# Patient Record
Sex: FEMALE | Race: WHITE | NOT HISPANIC OR LATINO | Employment: UNEMPLOYED
[De-identification: names, ages, dates, MRNs, and addresses within clinical notes are randomized per-mention and may not be internally consistent; named-entity substitution may affect disease eponyms.]

---

## 2022-10-02 ENCOUNTER — HEALTH MAINTENANCE LETTER (OUTPATIENT)
Age: 22
End: 2022-10-02

## 2022-10-14 ENCOUNTER — OFFICE VISIT (OUTPATIENT)
Dept: FAMILY MEDICINE | Facility: CLINIC | Age: 22
End: 2022-10-14
Payer: COMMERCIAL

## 2022-10-14 ENCOUNTER — ANCILLARY PROCEDURE (OUTPATIENT)
Dept: GENERAL RADIOLOGY | Facility: CLINIC | Age: 22
End: 2022-10-14
Attending: STUDENT IN AN ORGANIZED HEALTH CARE EDUCATION/TRAINING PROGRAM
Payer: COMMERCIAL

## 2022-10-14 VITALS
DIASTOLIC BLOOD PRESSURE: 94 MMHG | HEART RATE: 121 BPM | WEIGHT: 241 LBS | SYSTOLIC BLOOD PRESSURE: 134 MMHG | TEMPERATURE: 98.6 F | RESPIRATION RATE: 16 BRPM

## 2022-10-14 DIAGNOSIS — M54.42 CHRONIC MIDLINE LOW BACK PAIN WITH LEFT-SIDED SCIATICA: Primary | ICD-10-CM

## 2022-10-14 DIAGNOSIS — G89.29 CHRONIC MIDLINE LOW BACK PAIN WITH LEFT-SIDED SCIATICA: ICD-10-CM

## 2022-10-14 DIAGNOSIS — M54.42 CHRONIC MIDLINE LOW BACK PAIN WITH LEFT-SIDED SCIATICA: ICD-10-CM

## 2022-10-14 DIAGNOSIS — G89.29 CHRONIC MIDLINE LOW BACK PAIN WITH LEFT-SIDED SCIATICA: Primary | ICD-10-CM

## 2022-10-14 PROCEDURE — 72100 X-RAY EXAM L-S SPINE 2/3 VWS: CPT | Mod: TC | Performed by: RADIOLOGY

## 2022-10-14 PROCEDURE — 99204 OFFICE O/P NEW MOD 45 MIN: CPT | Performed by: STUDENT IN AN ORGANIZED HEALTH CARE EDUCATION/TRAINING PROGRAM

## 2022-10-14 RX ORDER — BUSPIRONE HYDROCHLORIDE 7.5 MG/1
7.5 TABLET ORAL 2 TIMES DAILY
COMMUNITY

## 2022-10-14 RX ORDER — CYCLOBENZAPRINE HCL 5 MG
5 TABLET ORAL 3 TIMES DAILY PRN
Qty: 30 TABLET | Refills: 1 | Status: SHIPPED | OUTPATIENT
Start: 2022-10-14

## 2022-10-14 ASSESSMENT — ENCOUNTER SYMPTOMS: BACK PAIN: 1

## 2022-10-14 NOTE — PROGRESS NOTES
Assessment & Plan     Chronic midline low back pain with left-sided sciatica  Chronic for the past 6 months, no trauma or injury.  Patient reports occasional left lower extremity numbness and tingling usually comes on with prolonged standing or walking and improves with sitting.  Patient reports back pain that is worse with tension of the back and improved with leaning forward such as pushing a cart.  Differential includes lower back pain with left-sided sciatica, left-sided piriformis syndrome, also on the differential is spinal stenosis.  Will order x-ray and MRI given neurological symptoms as noted above suspicion for spinal stenosis.  - cyclobenzaprine (FLEXERIL) 5 MG tablet  Dispense: 30 tablet; Refill: 1  - MR Lumbar Spine w/o Contrast  - XR Lumbar Spine 2/3 Views  - Physical Therapy Referral      Return in about 4 weeks (around 11/11/2022) for Follow up back pain.    Barby Srivastava MD  [facility]    Mirella Barnett is a 22 year old presenting for the following health issues:  Back Pain (Since April, lower back down into left leg )      Back Pain     History of Present Illness       Back Pain:  She presents for follow up of back pain. Patient's back pain is a chronic problem.  Location of back pain:  Left buttock and left hip  Description of back pain: dull ache  Back pain spreads: left buttocks, left thigh and left knee    Since patient first noticed back pain, pain is: gradually worsening  Does back pain interfere with her job:  Not applicable      She eats 2-3 servings of fruits and vegetables daily.She consumes 0 sweetened beverage(s) daily.She exercises with enough effort to increase her heart rate 20 to 29 minutes per day.  She exercises with enough effort to increase her heart rate 4 days per week.   She is taking medications regularly.       Back Pain with Left leg pain  - Onset: April, 6 months, no initial event. Worked at [employer] up until Feb, involves lifting.   -  Location/radiation:left lower back, and middle.   - Character: dull ache  - Aggravating factors: laying on left side, standing or sitting for a long time.  - Relieving factors: leaning forward such as pushing a cart at the store, ibuprofen helps, tylenol doesn't help  - Not able to walk for very long, has to sit and it gets better.  - Severity: at worst 7/10  - Denies red flags, including: Fever, history of IV drug use or malignancy, trauma or injury, loss of bowel or bladder control, saddle anesthesia,weakness of lower extremities.   - Occasional momentary numbness and tingling of the left leg, improved with sitting.         Review of Systems   Musculoskeletal: Positive for back pain.   Constitutional: Negative for fever and chills.   Respiratory: Negative for cough or shortness of breath.    Cardiovascular: Negative for chest pain or chest pressure.   Gastrointestinal: Negative for nausea, vomiting, diarrhea or abdominal pain.        Objective    BP (!) 134/94 (BP Location: Right arm, Patient Position: Sitting, Cuff Size: Adult Large)   Pulse (!) 121   Temp 98.6  F (37  C) (Temporal)   Resp 16   Wt 241 lb (109.3 kg)   There is no height or weight on file to calculate BMI.  Physical Exam   General Appearance:  Alert, cooperative, no distress, appears stated age.  Head:  Normocephalic, without obvious abnormality, atraumatic.  Eyes:  Conjunctivae/corneas clear, extraocular movements intact both eyes.  Lungs:  Clear to auscultation bilaterally, respirations unlabored.  Heart:  Regular rate and rhythm, S1 and S2 normal, no murmur, rub or gallop.  Abdomen:  Soft, non-tender, bowel sounds active all four quadrants.   Extremities:  Atraumatic, no cyanosis or edema.  Skin:  Skin color, texture, turgor normal, no rashes or lesions.  Neurologic: No focal deficits.  Back: Mild TTP of the midline lumbar spine and left paraspinal muscles. Positive straight leg raise on the left, negative on the right.  Limited extension of  the back secondary to pain.  Normal side bending and bending.  Normal strength and sensation in BLE.

## 2022-11-09 ENCOUNTER — HOSPITAL ENCOUNTER (OUTPATIENT)
Dept: PHYSICAL THERAPY | Facility: REHABILITATION | Age: 22
Discharge: HOME | End: 2022-11-09
Attending: STUDENT IN AN ORGANIZED HEALTH CARE EDUCATION/TRAINING PROGRAM
Payer: COMMERCIAL

## 2022-11-09 DIAGNOSIS — M54.42 CHRONIC MIDLINE LOW BACK PAIN WITH LEFT-SIDED SCIATICA: ICD-10-CM

## 2022-11-09 DIAGNOSIS — M54.16 LEFT LUMBAR RADICULOPATHY: Primary | ICD-10-CM

## 2022-11-09 DIAGNOSIS — M62.81 MUSCLE WEAKNESS (GENERALIZED): ICD-10-CM

## 2022-11-09 DIAGNOSIS — G89.29 CHRONIC MIDLINE LOW BACK PAIN WITH LEFT-SIDED SCIATICA: ICD-10-CM

## 2022-11-09 PROCEDURE — 97110 THERAPEUTIC EXERCISES: CPT | Mod: GP | Performed by: PHYSICAL THERAPIST

## 2022-11-09 PROCEDURE — 97161 PT EVAL LOW COMPLEX 20 MIN: CPT | Mod: GP | Performed by: PHYSICAL THERAPIST

## 2022-11-09 NOTE — PROGRESS NOTES
"Assessment:  Pt presents to skilled PT with low back with L LE radicular/sciatic type sx that started in April 2022.  The pt has minimal loss of lumbar ROM with increased pain and sx into lumbar extension, lumbar rotation and L SB + extension.  The pt has good overall strength with decreased core strength and awareness.  She has a + L SLR, crossover and slump.  She has increased tenderness over the lumbar spine, LSI and gluteals.  She will benefit from skilled PT to address these impairments identified in the evaluation.       Exercises:    Date 11/9/22   Exercise    Seated swinging nerve glide   R, L x10 alt   Supine LTR  Bx10    Piriformis stretch+ supine H/S stretch+ nerve glide  R, L x30\" + x30\" + x5    Ab set  x5 with 5\" hold    Supine Bridge  Bx10 - cues for glute squeezes if too much pain                                  Felisa Salas, PT   "

## 2022-11-09 NOTE — PROGRESS NOTES
[facility] Initial Evaluation        11/09/22 1200   General Information   Type of Visit Initial OP Ortho PT Evaluation   Start of Care Date 11/09/22   Referring Physician Dr. Barby Srivastava   Patient/Family Goals Statement reduce sciatica and walk for longer   Orders Evaluate and Treat   Date of Order 10/14/22   Certification Required? No   Medical Diagnosis Chronic midline low back pain with left-sided sciatica   Body Part(s)   Body Part(s) Lumbar Spine/SI   Presentation and Etiology   Pertinent history of current problem (include personal factors and/or comorbidities that impact the POC) Pt reports having low back pain with some sx into her L LE that are intermittent.  This started in April 2022 and it is now getting worse.  The sx come on with standing or walking for more than a few minutes.  No mechanism.  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  Her sx are in her central to L low back and then radiates into her L buttocks/hip and then her leg can go numb all the way to her foot.  She reports that the numbness occurs about 1x/day now but she has also been avoiding standing for any length of time.  She takes an occasional NSAID, no ice/heat, no stretches or exercises.  She was walking and playing tennis until a few months ago - this was ok with some on/off pain.   Onset date of current episode/exacerbation 04/01/22   Pain quality B. Dull;C. Aching;E. Shooting;H. Other   Pain quality comment numbness and tingling   Prior Level of Function   Prior Level of Function-Mobility WNL   Prior Level of Function-ADLs WNL   Fall Risk Screen   Fall screen completed by PT   Have you fallen 2 or more times in the past year? No   Have you fallen and had an injury in the past year? No   Is patient a fall risk? No   Abuse Screen (yes response referral indicated)   Feels Unsafe at Home or Work/School no   Feels Threatened by Someone no   Does Anyone Try to Keep  You From Having Contact with Others or Doing Things Outside Your Home? no   Physical Signs of Abuse Present no   Patient needs abuse support services and resources No   Lumbar Spine/SI Objective Findings   Hamstring Flexibility dec   Hip Flexor Flexibility dec   Quadricep Flexibility dec   Piriformis Flexibility dec   Flexion ROM no loss, no pain   Extension ROM min loss, avoidance, pain in the L gluteals   Right Side Bending ROM no loss, no pain   Left Side Bending ROM No loss, no pain   Repeated Extension-Standing ROM Pain with L SB + ext   Lumbar ROM Comment R Rot = min loss, L gluteal pain;  L LBP= min loss, min L gluteal pain   Hip Flexion (L2) Strength 5   Hip Abduction Strength 5   Hip Adduction Strength 5   Hip Extension Strength 4   Knee Flexion Strength 5   Knee Extension (L3) Strength 5   Ankle Dorsiflexion (L4) Strength 5   Great Toe Extension (L5) Strength 5   Ankle Plantar Flexion (S1) Strength 5   SLR +L   Crossover SLR + L   Repeated Extension Prone pain is lumbar   Segmental Mobility pain over L3, L4, L5, S1   Palpation increased tightness and over L>R   Slump Test + L   Posture fair - slumped posture in sitting and standing   Sensation Testing WNL   Planned Therapy Interventions   Planned Therapy Interventions joint mobilization;manual therapy;neuromuscular re-education;ROM;strengthening;stretching   Planned Modality Interventions   Planned Modality Interventions Cryotherapy;Electrical stimulation;Hot packs;TENS;Traction   Clinical Impression   Criteria for Skilled Therapeutic Interventions Met yes, treatment indicated   PT Diagnosis LBP with L LE radiculopathy   Influenced by the following impairments weakness, + nerve sx, impaired posture   Functional limitations due to impairments stand, walk, ADL's   Clinical Presentation Stable/Uncomplicated   Clinical Decision Making (Complexity) Low complexity   Therapy Frequency 1 time/week   Predicted Duration of Therapy Intervention (days/wks) 1x/week for  6 visits   Risk & Benefits of therapy have been explained Yes   Patient, Family & other staff in agreement with plan of care Yes   Clinical Impression Comments Assessment:  Pt presents to skilled PT with low back with L LE radicular/sciatic type sx that started in April 2022.  The pt has minimal loss of lumbar ROM with increased pain and sx into lumbar extension, lumbar rotation and L SB + extension.  The pt has good overall strength with decreased core strength and awareness.  She has a + L SLR, crossover and slump.  She has increased tenderness over the lumbar spine, LSI and gluteals.  She will benefit from skilled PT to address these impairments identified in the evaluation.   Ortho Goal 1   Goal Identifier 1   Goal Description Pt will demonstrate readiness for independent sx management and HEP   Target Date 12/24/22   Ortho Goal 2   Goal Identifier 2   Goal Description Pt will be able to stand for ADL's with increased ease and pain <=4/10   Target Date 12/24/22   Ortho Goal 3   Goal Identifier 3   Goal Description Pt will be able to walk for community mobility, shopping, etc with increased ease and pain <=4/10   Date Met 12/24/22   Ortho Goal 4   Goal Description 4   Goal Progress Pt will decrease her CARTER score to <25% to demonstrate improved function and decreased pain   Date Met 12/24/22   Total Evaluation Time   PT Eval, Low Complexity Minutes (01384) 20     Felisa Salas PT

## 2022-11-11 ENCOUNTER — HOSPITAL ENCOUNTER (OUTPATIENT)
Dept: MRI IMAGING | Facility: HOSPITAL | Age: 22
Discharge: HOME | End: 2022-11-11
Attending: STUDENT IN AN ORGANIZED HEALTH CARE EDUCATION/TRAINING PROGRAM | Admitting: STUDENT IN AN ORGANIZED HEALTH CARE EDUCATION/TRAINING PROGRAM
Payer: COMMERCIAL

## 2022-11-11 DIAGNOSIS — G89.29 CHRONIC MIDLINE LOW BACK PAIN WITH LEFT-SIDED SCIATICA: ICD-10-CM

## 2022-11-11 DIAGNOSIS — M54.42 CHRONIC MIDLINE LOW BACK PAIN WITH LEFT-SIDED SCIATICA: ICD-10-CM

## 2022-11-11 PROCEDURE — 72148 MRI LUMBAR SPINE W/O DYE: CPT

## 2022-11-13 PROBLEM — G89.29 CHRONIC MIDLINE LOW BACK PAIN WITH LEFT-SIDED SCIATICA: Status: ACTIVE | Noted: 2022-11-13

## 2022-11-13 PROBLEM — M54.42 CHRONIC MIDLINE LOW BACK PAIN WITH LEFT-SIDED SCIATICA: Status: ACTIVE | Noted: 2022-11-13

## 2022-11-14 ENCOUNTER — OFFICE VISIT (OUTPATIENT)
Dept: FAMILY MEDICINE | Facility: CLINIC | Age: 22
End: 2022-11-14
Payer: COMMERCIAL

## 2022-11-14 VITALS
SYSTOLIC BLOOD PRESSURE: 138 MMHG | DIASTOLIC BLOOD PRESSURE: 94 MMHG | BODY MASS INDEX: 39.49 KG/M2 | HEART RATE: 112 BPM | TEMPERATURE: 98 F | RESPIRATION RATE: 22 BRPM | WEIGHT: 237 LBS | HEIGHT: 65 IN | OXYGEN SATURATION: 97 %

## 2022-11-14 DIAGNOSIS — Z11.3 SCREENING FOR STDS (SEXUALLY TRANSMITTED DISEASES): ICD-10-CM

## 2022-11-14 DIAGNOSIS — Z11.4 SCREENING FOR HIV (HUMAN IMMUNODEFICIENCY VIRUS): ICD-10-CM

## 2022-11-14 DIAGNOSIS — G89.29 CHRONIC MIDLINE LOW BACK PAIN WITH LEFT-SIDED SCIATICA: Primary | ICD-10-CM

## 2022-11-14 DIAGNOSIS — Z13.220 SCREENING FOR HYPERLIPIDEMIA: ICD-10-CM

## 2022-11-14 DIAGNOSIS — R00.0 TACHYCARDIA: ICD-10-CM

## 2022-11-14 DIAGNOSIS — Z13.1 SCREENING FOR DIABETES MELLITUS: ICD-10-CM

## 2022-11-14 DIAGNOSIS — M54.42 CHRONIC MIDLINE LOW BACK PAIN WITH LEFT-SIDED SCIATICA: Primary | ICD-10-CM

## 2022-11-14 DIAGNOSIS — R03.0 ELEVATED BP WITHOUT DIAGNOSIS OF HYPERTENSION: ICD-10-CM

## 2022-11-14 DIAGNOSIS — Z11.59 NEED FOR HEPATITIS C SCREENING TEST: ICD-10-CM

## 2022-11-14 DIAGNOSIS — Z13.21 ENCOUNTER FOR VITAMIN DEFICIENCY SCREENING: ICD-10-CM

## 2022-11-14 LAB
ALBUMIN SERPL BCG-MCNC: 4.6 G/DL (ref 3.5–5.2)
ALP SERPL-CCNC: 72 U/L (ref 35–104)
ALT SERPL W P-5'-P-CCNC: 29 U/L (ref 10–35)
ANION GAP SERPL CALCULATED.3IONS-SCNC: 13 MMOL/L (ref 7–15)
AST SERPL W P-5'-P-CCNC: 27 U/L (ref 10–35)
BILIRUB SERPL-MCNC: 0.3 MG/DL
BUN SERPL-MCNC: 9.5 MG/DL (ref 6–20)
CALCIUM SERPL-MCNC: 9.9 MG/DL (ref 8.6–10)
CHLORIDE SERPL-SCNC: 101 MMOL/L (ref 98–107)
CHOLEST SERPL-MCNC: 180 MG/DL
CREAT SERPL-MCNC: 0.61 MG/DL (ref 0.51–0.95)
DEPRECATED HCO3 PLAS-SCNC: 24 MMOL/L (ref 22–29)
ERYTHROCYTE [DISTWIDTH] IN BLOOD BY AUTOMATED COUNT: 12.3 % (ref 10–15)
GFR SERPL CREATININE-BSD FRML MDRD: >90 ML/MIN/1.73M2
GLUCOSE SERPL-MCNC: 114 MG/DL (ref 70–99)
HBA1C MFR BLD: 5.6 % (ref 0–5.6)
HCT VFR BLD AUTO: 42.6 % (ref 35–47)
HDLC SERPL-MCNC: 61 MG/DL
HGB BLD-MCNC: 14.2 G/DL (ref 11.7–15.7)
LDLC SERPL CALC-MCNC: 100 MG/DL
MCH RBC QN AUTO: 28.7 PG (ref 26.5–33)
MCHC RBC AUTO-ENTMCNC: 33.3 G/DL (ref 31.5–36.5)
MCV RBC AUTO: 86 FL (ref 78–100)
NONHDLC SERPL-MCNC: 119 MG/DL
PLATELET # BLD AUTO: 470 10E3/UL (ref 150–450)
POTASSIUM SERPL-SCNC: 4.1 MMOL/L (ref 3.4–5.3)
PROT SERPL-MCNC: 7.8 G/DL (ref 6.4–8.3)
RBC # BLD AUTO: 4.94 10E6/UL (ref 3.8–5.2)
SODIUM SERPL-SCNC: 138 MMOL/L (ref 136–145)
TRIGL SERPL-MCNC: 95 MG/DL
TSH SERPL DL<=0.005 MIU/L-ACNC: 3.3 UIU/ML (ref 0.3–4.2)
WBC # BLD AUTO: 8 10E3/UL (ref 4–11)

## 2022-11-14 PROCEDURE — 80053 COMPREHEN METABOLIC PANEL: CPT | Performed by: STUDENT IN AN ORGANIZED HEALTH CARE EDUCATION/TRAINING PROGRAM

## 2022-11-14 PROCEDURE — 83036 HEMOGLOBIN GLYCOSYLATED A1C: CPT | Performed by: STUDENT IN AN ORGANIZED HEALTH CARE EDUCATION/TRAINING PROGRAM

## 2022-11-14 PROCEDURE — 84443 ASSAY THYROID STIM HORMONE: CPT | Performed by: STUDENT IN AN ORGANIZED HEALTH CARE EDUCATION/TRAINING PROGRAM

## 2022-11-14 PROCEDURE — 36415 COLL VENOUS BLD VENIPUNCTURE: CPT | Performed by: STUDENT IN AN ORGANIZED HEALTH CARE EDUCATION/TRAINING PROGRAM

## 2022-11-14 PROCEDURE — 85027 COMPLETE CBC AUTOMATED: CPT | Performed by: STUDENT IN AN ORGANIZED HEALTH CARE EDUCATION/TRAINING PROGRAM

## 2022-11-14 PROCEDURE — 86780 TREPONEMA PALLIDUM: CPT | Performed by: STUDENT IN AN ORGANIZED HEALTH CARE EDUCATION/TRAINING PROGRAM

## 2022-11-14 PROCEDURE — 86803 HEPATITIS C AB TEST: CPT | Performed by: STUDENT IN AN ORGANIZED HEALTH CARE EDUCATION/TRAINING PROGRAM

## 2022-11-14 PROCEDURE — 87389 HIV-1 AG W/HIV-1&-2 AB AG IA: CPT | Performed by: STUDENT IN AN ORGANIZED HEALTH CARE EDUCATION/TRAINING PROGRAM

## 2022-11-14 PROCEDURE — 99214 OFFICE O/P EST MOD 30 MIN: CPT | Performed by: STUDENT IN AN ORGANIZED HEALTH CARE EDUCATION/TRAINING PROGRAM

## 2022-11-14 PROCEDURE — 82306 VITAMIN D 25 HYDROXY: CPT | Performed by: STUDENT IN AN ORGANIZED HEALTH CARE EDUCATION/TRAINING PROGRAM

## 2022-11-14 PROCEDURE — 80061 LIPID PANEL: CPT | Performed by: STUDENT IN AN ORGANIZED HEALTH CARE EDUCATION/TRAINING PROGRAM

## 2022-11-14 RX ORDER — GABAPENTIN 100 MG/1
100 CAPSULE ORAL 3 TIMES DAILY PRN
Qty: 90 CAPSULE | Refills: 1 | Status: SHIPPED | OUTPATIENT
Start: 2022-11-14

## 2022-11-14 NOTE — PROGRESS NOTES
Assessment & Plan     Chronic midline low back pain with left-sided sciatica  Symptoms similar to prior visit.  Patient reports that Flexeril is not working.  Naproxen seems to be the only thing that is working.  Still feels that pain is disruptive to daily life.  Denies any red flag symptoms.  MRI 11/11/22:  Large left paracentral disc extrusion at L5-S1 resulting in moderate-severe narrowing of the left lateral recess of the spinal canal possibly resulting in irritation or impingement upon the descending left S1 and/or S2 nerve roots.  We will trial gabapentin.   - gabapentin (NEURONTIN) 100 MG capsule  Dispense: 90 capsule; Refill: 1 -discussed side effect of drowsiness.  Patient can start with 100 mg nightly as needed and increase to 300 mg 3 times daily as needed.  -Continue naproxen as needed  - Previously referred to spine, provided patient with referral phone number if no one calls her within 2 days.    Tachycardia  Elevated BP without diagnosis of hypertension  Did on last visit and today.  Tachycardia up to 130, on recheck improved to 112.  Blood pressure is elevated 130/94, recheck was similar.  Patient reports that ambulatory blood pressures and heart rates are normal and that her elevated blood pressure and tachycardia are secondary to anxiety.  Suspect that this is whitecoat hypertension/tachycardia.  Rhythm is normal on exam.  Asymptomatic.  We will check blood count for anemia, electrolytes, thyroid function.  - CBC with platelets  - Comprehensive metabolic panel (BMP + Alb, Alk Phos, ALT, AST, Total. Bili, TP)  - TSH with free T4 reflex  -Instructed patient to take ambulatory blood pressure and heart rate readings and bring her to next visit.  -Address anxiety as noted below    Anxiety  Chronic, appears well controlled.  Follows with telehealth [facility].  MARIANO-7 score 4.    - Continue BuSpar as prescribed by [employer].    Screening for STDs (sexually transmitted diseases)  - Treponema Abs w Reflex  "to RPR and Titer  - Treponema Abs w Reflex to RPR and Titer    Screening for HIV (human immunodeficiency virus)  - HIV Antigen Antibody Combo  - HIV Antigen Antibody Combo    Need for hepatitis C screening test  - Hepatitis C Screen Reflex to HCV RNA Quant and Genotype  - Hepatitis C Screen Reflex to HCV RNA Quant and Genotype    Screening for hyperlipidemia  - Lipid Profile (Chol, Trig, HDL, LDL calc)  - Lipid Profile (Chol, Trig, HDL, LDL calc)    Screening for diabetes mellitus  - Hemoglobin A1c  - Hemoglobin A1c    Encounter for vitamin deficiency screening  - Vitamin D Deficiency  - Vitamin D Deficiency      Review of external notes as documented elsewhere in note  30 minutes spent on the date of the encounter doing chart review, history and exam, documentation and further activities per the note       BMI:   Estimated body mass index is 39.73 kg/m  as calculated from the following:    Height as of this encounter: 1.645 m (5' 4.76\").    Weight as of this encounter: 107.5 kg (237 lb).       Return in about 4 weeks (around 12/12/2022) for Routine preventive, FU back pain.    Barby Srivastava MD  [facility]    Mirella Barnett is a 22 year old accompanied by her self, presenting for the following health issues:  office visit (Follow up on back pain)      History of Present Illness       Back Pain:  She presents for follow up of back pain. Patient's back pain is a chronic problem.  Location of back pain:  Left lower back, left buttock and left side of waist  Description of back pain: dull ache  Back pain spreads: left buttocks, left thigh and left knee    Since patient first noticed back pain, pain is: gradually worsening  Does back pain interfere with her job:  Not applicable      She eats 2-3 servings of fruits and vegetables daily.She consumes 0 sweetened beverage(s) daily.She exercises with enough effort to increase her heart rate 20 to 29 minutes per day.  She exercises with enough effort " "to increase her heart rate 3 or less days per week.   She is taking medications regularly.    Pt report symptoms have been the same.   Tried flexeril or 10 days, daily, didn't help.   Tylenol occasionally.   Ibuprofen doesn't help  Midol seems to help.   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 In person Monday  Pain usually with moving.   Makes daily life activities a struggle, but still able to do them.   - Denies red flags, including: Fever, history of IV drug use or malignancy, trauma or injury, loss of bowel or bladder control, saddle anesthesia, or weakness.   - Still has intermittent numbness tingling of  Left leg.     Tachycardia   Elevated BP without Diagnosis  - Reports that she has anxiety and that it is normal at home.   - BP at home normal or slightly higher than normal.   - reports that she's had these findings int he past as well  - no ekg on file.   - HR at home is less than 90 per pt  - Reports that she has anxiety and is on Buspar 7.5 mg BID.   - Denies CP, HA, dizziness, palpitations or SOB.     Anxiety  - Follows [employer]   - receives her meds from them.   - Denies SI or HI.         Review of Systems   Constitutional, HEENT, cardiovascular, pulmonary, gi and gu systems are negative, except as otherwise noted.      Objective    BP (!) 138/94 (BP Location: Left arm, Patient Position: Sitting, Cuff Size: Adult Large)   Pulse 112   Temp 98  F (36.7  C) (Temporal)   Resp 22   Ht 1.645 m (5' 4.76\")   Wt 107.5 kg (237 lb)   LMP 11/08/2022   SpO2 97%   BMI 39.73 kg/m    Body mass index is 39.73 kg/m .  Physical Exam   General Appearance:  Alert, cooperative, no distress, appears stated age.  Head:  Normocephalic, without obvious abnormality, atraumatic.  Eyes:  Conjunctivae/corneas clear, extraocular movements intact both eyes.  Lungs:  Clear to auscultation bilaterally, respirations unlabored.  Heart:  Regular rhythm, tachycardic, S1 and S2 normal, no murmur, rub or gallop.  Abdomen:  " Soft, non-tender, bowel sounds active all four quadrants.   Extremities:  Atraumatic, no cyanosis or edema.  Skin:  Skin color, texture, turgor normal, no rashes or lesions.  Neurologic: No focal deficits.

## 2022-11-15 LAB
DEPRECATED CALCIDIOL+CALCIFEROL SERPL-MC: 22 UG/L (ref 20–75)
HCV AB SERPL QL IA: NONREACTIVE
HIV 1+2 AB+HIV1 P24 AG SERPL QL IA: NONREACTIVE
T PALLIDUM AB SER QL: NONREACTIVE

## 2022-12-02 ENCOUNTER — HOSPITAL ENCOUNTER (OUTPATIENT)
Dept: PHYSICAL THERAPY | Facility: REHABILITATION | Age: 22
Discharge: HOME | End: 2022-12-02
Payer: COMMERCIAL

## 2022-12-02 DIAGNOSIS — M54.42 CHRONIC MIDLINE LOW BACK PAIN WITH LEFT-SIDED SCIATICA: Primary | ICD-10-CM

## 2022-12-02 DIAGNOSIS — G89.29 CHRONIC MIDLINE LOW BACK PAIN WITH LEFT-SIDED SCIATICA: Primary | ICD-10-CM

## 2022-12-02 DIAGNOSIS — M54.16 LEFT LUMBAR RADICULOPATHY: ICD-10-CM

## 2022-12-02 DIAGNOSIS — M62.81 MUSCLE WEAKNESS (GENERALIZED): ICD-10-CM

## 2022-12-02 PROCEDURE — 97110 THERAPEUTIC EXERCISES: CPT | Mod: GP

## 2022-12-02 PROCEDURE — 97140 MANUAL THERAPY 1/> REGIONS: CPT | Mod: GP

## 2022-12-02 NOTE — PROGRESS NOTES
"Assessment: Ericka reports that she is able to stand more erect and for increased duration before increase in symptoms. Therapy today focused on review and progression of HEP, which the patient tolerated well. She will continue to benefit from skilled therapy to progress toward her goals and improve her function.      Exercises:    Date 12/2/22 11/9/22   Exercise     Seated swinging nerve glide   1 x 20 bilateral R, L x10 alt   Supine LTR  1 x 20 bilateral Bx10    Piriformis stretch+ supine H/S stretch+ nerve glide  2 x (x30\" + x30\" + x10 bilateral) R, L x30\" + x30\" + x5    Ab set  1 x 20, 1 x 10 x 5 second holds. With march: 1 x 10. x5 with 5\" hold    Supine Bridge  2 x 10 bilateral Bx10 - cues for glute squeezes if too much pain    Nustep 5 minutes                                    Rao Tejada, PT  "

## 2022-12-06 ENCOUNTER — TELEPHONE (OUTPATIENT)
Dept: PHYSICAL THERAPY | Facility: REHABILITATION | Age: 22
End: 2022-12-06

## 2022-12-06 NOTE — TELEPHONE ENCOUNTER
I called Ericka and left a message to see if she could reschedule her appointment this Friday afternoon. I gave her the  number to call back if any of the options worked.

## 2023-01-05 NOTE — ADDENDUM NOTE
Encounter addended by: Rao Tejada, PT on: 1/5/2023 2:10 PM   Actions taken: Episode resolved, Clinical Note Signed

## 2023-01-05 NOTE — PROGRESS NOTES
[facility]    Outpatient Physical Therapy Discharge Note  Patient: Ericka Denise  : 2000    Beginning/End Dates of Reporting Period:  22 to 22    Referring Provider: Dr. Barby Srivastava MD    Therapy Diagnosis: LBP with L LE radiculopathy     Client Self Report: Ericka reports that she feels like she can stand straighter and a little longer. She said that her pain has been about the same. She said that she has been doing the exercises without any issues.    Objective Measurements:  Objective Measure: CARTER  Details: eval=32%      Outcome Measures (most recent score):  CARTER: 32%    Goals:  Goal Identifier 1   Goal Description Pt will demonstrate readiness for independent sx management and HEP   Target Date 22   Date Met      Progress (detail required for progress note):       Goal Identifier 2   Goal Description Pt will be able to stand for ADL's with increased ease and pain <=4/10   Target Date 22   Date Met      Progress (detail required for progress note):       Goal Identifier 3   Goal Description Pt will be able to walk for community mobility, shopping, etc with increased ease and pain <=4/10   Target Date     Date Met  22   Progress (detail required for progress note):       Goal Identifier     Goal Description 4   Target Date     Date Met  22   Progress (detail required for progress note): Pt will decrease her CARTER score to <25% to demonstrate improved function and decreased pain         Plan:  Discharge from therapy.    Discharge:    Reason for Discharge: Patient chooses to discontinue therapy and cancelled the remainder of her visits.    Equipment Issued: NA    Discharge Plan: PT was unable to discuss discharge planning with Joel as the discharge was unplanned.

## 2023-10-21 ENCOUNTER — HEALTH MAINTENANCE LETTER (OUTPATIENT)
Age: 23
End: 2023-10-21

## 2024-12-14 ENCOUNTER — HEALTH MAINTENANCE LETTER (OUTPATIENT)
Age: 24
End: 2024-12-14

## 2025-05-05 SDOH — HEALTH STABILITY: PHYSICAL HEALTH: ON AVERAGE, HOW MANY MINUTES DO YOU ENGAGE IN EXERCISE AT THIS LEVEL?: 30 MIN

## 2025-05-05 SDOH — HEALTH STABILITY: PHYSICAL HEALTH: ON AVERAGE, HOW MANY DAYS PER WEEK DO YOU ENGAGE IN MODERATE TO STRENUOUS EXERCISE (LIKE A BRISK WALK)?: 5 DAYS

## 2025-05-05 ASSESSMENT — SOCIAL DETERMINANTS OF HEALTH (SDOH): HOW OFTEN DO YOU GET TOGETHER WITH FRIENDS OR RELATIVES?: THREE TIMES A WEEK

## 2025-05-08 ENCOUNTER — OFFICE VISIT (OUTPATIENT)
Dept: FAMILY MEDICINE | Facility: CLINIC | Age: 25
End: 2025-05-08
Payer: COMMERCIAL

## 2025-05-08 VITALS
BODY MASS INDEX: 43.65 KG/M2 | OXYGEN SATURATION: 100 % | SYSTOLIC BLOOD PRESSURE: 160 MMHG | HEART RATE: 97 BPM | TEMPERATURE: 98 F | RESPIRATION RATE: 20 BRPM | DIASTOLIC BLOOD PRESSURE: 82 MMHG | WEIGHT: 262 LBS | HEIGHT: 65 IN

## 2025-05-08 DIAGNOSIS — E66.813 CLASS 3 SEVERE OBESITY WITHOUT SERIOUS COMORBIDITY WITH BODY MASS INDEX (BMI) OF 40.0 TO 44.9 IN ADULT, UNSPECIFIED OBESITY TYPE (H): ICD-10-CM

## 2025-05-08 DIAGNOSIS — N92.6 IRREGULAR PERIODS: ICD-10-CM

## 2025-05-08 DIAGNOSIS — R00.0 TACHYCARDIA: ICD-10-CM

## 2025-05-08 DIAGNOSIS — F41.9 ANXIETY: ICD-10-CM

## 2025-05-08 DIAGNOSIS — Z23 NEED FOR VACCINATION: ICD-10-CM

## 2025-05-08 DIAGNOSIS — Z00.00 ROUTINE GENERAL MEDICAL EXAMINATION AT A HEALTH CARE FACILITY: Primary | ICD-10-CM

## 2025-05-08 DIAGNOSIS — R03.0 ELEVATED BP WITHOUT DIAGNOSIS OF HYPERTENSION: ICD-10-CM

## 2025-05-08 DIAGNOSIS — Z13.220 SCREENING FOR HYPERLIPIDEMIA: ICD-10-CM

## 2025-05-08 DIAGNOSIS — Z13.1 SCREENING FOR DIABETES MELLITUS: ICD-10-CM

## 2025-05-08 DIAGNOSIS — Z12.4 CERVICAL CANCER SCREENING: ICD-10-CM

## 2025-05-08 PROBLEM — E66.9 OBESITY: Status: ACTIVE | Noted: 2025-05-08

## 2025-05-08 LAB
ERYTHROCYTE [DISTWIDTH] IN BLOOD BY AUTOMATED COUNT: 13 % (ref 10–15)
EST. AVERAGE GLUCOSE BLD GHB EST-MCNC: 111 MG/DL
HBA1C MFR BLD: 5.5 % (ref 0–5.6)
HCT VFR BLD AUTO: 38.6 % (ref 35–47)
HGB BLD-MCNC: 12.8 G/DL (ref 11.7–15.7)
MCH RBC QN AUTO: 28.4 PG (ref 26.5–33)
MCHC RBC AUTO-ENTMCNC: 33.2 G/DL (ref 31.5–36.5)
MCV RBC AUTO: 86 FL (ref 78–100)
PLATELET # BLD AUTO: 402 10E3/UL (ref 150–450)
RBC # BLD AUTO: 4.51 10E6/UL (ref 3.8–5.2)
WBC # BLD AUTO: 10.3 10E3/UL (ref 4–11)

## 2025-05-08 RX ORDER — BUSPIRONE HYDROCHLORIDE 15 MG/1
TABLET ORAL
COMMUNITY
Start: 2025-01-15

## 2025-05-08 NOTE — PATIENT INSTRUCTIONS
Patient Education   Preventive Care Advice   This is general advice given by our system to help you stay healthy. However, your care team may have specific advice just for you. Please talk to your care team about your preventive care needs.  Nutrition  Eat 5 or more servings of fruits and vegetables each day.  Try wheat bread, brown rice and whole grain pasta (instead of white bread, rice, and pasta).  Get enough calcium and vitamin D. Check the label on foods and aim for 100% of the RDA (recommended daily allowance).  Lifestyle  Exercise at least 150 minutes each week  (30 minutes a day, 5 days a week).  Do muscle strengthening activities 2 days a week. These help control your weight and prevent disease.  No smoking.  Wear sunscreen to prevent skin cancer.  Have a dental exam and cleaning every 6 months.  Yearly exams  See your health care team every year to talk about:  Any changes in your health.  Any medicines your care team has prescribed.  Preventive care, family planning, and ways to prevent chronic diseases.  Shots (vaccines)   HPV shots (up to age 26), if you've never had them before.  Hepatitis B shots (up to age 59), if you've never had them before.  COVID-19 shot: Get this shot when it's due.  Flu shot: Get a flu shot every year.  Tetanus shot: Get a tetanus shot every 10 years.  Pneumococcal, hepatitis A, and RSV shots: Ask your care team if you need these based on your risk.  Shingles shot (for age 50 and up)  General health tests  Diabetes screening:  Starting at age 35, Get screened for diabetes at least every 3 years.  If you are younger than age 35, ask your care team if you should be screened for diabetes.  Cholesterol test: At age 39, start having a cholesterol test every 5 years, or more often if advised.  Bone density scan (DEXA): At age 50, ask your care team if you should have this scan for osteoporosis (brittle bones).  Hepatitis C: Get tested at least once in your life.  STIs (sexually  transmitted infections)  Before age 24: Ask your care team if you should be screened for STIs.  After age 24: Get screened for STIs if you're at risk. You are at risk for STIs (including HIV) if:  You are sexually active with more than one person.  You don't use condoms every time.  You or a partner was diagnosed with a sexually transmitted infection.  If you are at risk for HIV, ask about PrEP medicine to prevent HIV.  Get tested for HIV at least once in your life, whether you are at risk for HIV or not.  Cancer screening tests  Cervical cancer screening: If you have a cervix, begin getting regular cervical cancer screening tests starting at age 21.  Breast cancer scan (mammogram): If you've ever had breasts, begin having regular mammograms starting at age 40. This is a scan to check for breast cancer.  Colon cancer screening: It is important to start screening for colon cancer at age 45.  Have a colonoscopy test every 10 years (or more often if you're at risk) Or, ask your provider about stool tests like a FIT test every year or Cologuard test every 3 years.  To learn more about your testing options, visit:   .  For help making a decision, visit:   https://bit.ly/db07511.  Prostate cancer screening test: If you have a prostate, ask your care team if a prostate cancer screening test (PSA) at age 55 is right for you.  Lung cancer screening: If you are a current or former smoker ages 50 to 80, ask your care team if ongoing lung cancer screenings are right for you.  For informational purposes only. Not to replace the advice of your health care provider. Copyright   2023 [facility]. All rights reserved. Clinically reviewed by the [facility]. [employer] 225570 - REV 01/24.  Learning About Stress  What is stress?     Stress is your body's response to a hard situation. Your body can have a physical, emotional, or mental response. Stress is a fact of life for most people, and it  affects everyone differently. What causes stress for you may not be stressful for someone else.  A lot of things can cause stress. You may feel stress when you go on a job interview, take a test, or run a race. This kind of short-term stress is normal and even useful. It can help you if you need to work hard or react quickly. For example, stress can help you finish an important job on time.  Long-term stress is caused by ongoing stressful situations or events. Examples of long-term stress include long-term health problems, ongoing problems at work, or conflicts in your family. Long-term stress can harm your health.  How does stress affect your health?  When you are stressed, your body responds as though you are in danger. It makes hormones that speed up your heart, make you breathe faster, and give you a burst of energy. This is called the fight-or-flight stress response. If the stress is over quickly, your body goes back to normal and no harm is done.  But if stress happens too often or lasts too long, it can have bad effects. Long-term stress can make you more likely to get sick, and it can make symptoms of some diseases worse. If you tense up when you are stressed, you may develop neck, shoulder, or low back pain. Stress is linked to high blood pressure and heart disease.  Stress also harms your emotional health. It can make you solis, tense, or depressed. Your relationships may suffer, and you may not do well at work or school.  What can you do to manage stress?  You can try these things to help manage stress:   Do something active. Exercise or activity can help reduce stress. Walking is a great way to get started. Even everyday activities such as housecleaning or yard work can help.  Try yoga or deysi chi. These techniques combine exercise and meditation. You may need some training at first to learn them.  Do something you enjoy. For example, listen to music or go to a movie. Practice your hobby or do volunteer  "work.  Meditate. This can help you relax, because you are not worrying about what happened before or what may happen in the future.  Do guided imagery. Imagine yourself in any setting that helps you feel calm. You can use online videos, books, or a teacher to guide you.  Do breathing exercises. For example:  From a standing position, bend forward from the waist with your knees slightly bent. Let your arms dangle close to the floor.  Breathe in slowly and deeply as you return to a standing position. Roll up slowly and lift your head last.  Hold your breath for just a few seconds in the standing position.  Breathe out slowly and bend forward from the waist.  Let your feelings out. Talk, laugh, cry, and express anger when you need to. Talking with supportive friends or family, a counselor, or a shagufta leader about your feelings is a healthy way to relieve stress. Avoid discussing your feelings with people who make you feel worse.  Write. It may help to write about things that are bothering you. This helps you find out how much stress you feel and what is causing it. When you know this, you can find better ways to cope.  What can you do to prevent stress?  You might try some of these things to help prevent stress:  Manage your time. This helps you find time to do the things you want and need to do.  Get enough sleep. Your body recovers from the stresses of the day while you are sleeping.  Get support. Your family, friends, and community can make a difference in how you experience stress.  Limit your news feed. Avoid or limit time on social media or news that may make you feel stressed.  Do something active. Exercise or activity can help reduce stress. Walking is a great way to get started.  Where can you learn more?  Go to https://www.[employer].net/patiented  Enter N032 in the search box to learn more about \"Learning About Stress.\"  Current as of: October 24, 2024  Content Version: 14.4 2024-2025 [employer].   Care instructions adapted under license by your healthcare professional. If you have questions about a medical condition or this instruction, always ask your healthcare professional. [employer] disclaims any warranty or liability for your use of this information.

## 2025-05-08 NOTE — PROGRESS NOTES
***  Chronic midline low back pain with left-sided sciatica  Symptoms similar to prior visit.  Patient reports that Flexeril is not working.  Naproxen seems to be the only thing that is working.  Still feels that pain is disruptive to daily life.  Denies any red flag symptoms.  MRI 11/11/22:  Large left paracentral disc extrusion at L5-S1 resulting in moderate-severe narrowing of the left lateral recess of the spinal canal possibly resulting in irritation or impingement upon the descending left S1 and/or S2 nerve roots.  We will trial gabapentin.   - gabapentin (NEURONTIN) 100 MG capsule  Dispense: 90 capsule; Refill: 1 -discussed side effect of drowsiness.  Patient can start with 100 mg nightly as needed and increase to 300 mg 3 times daily as needed.  -Continue naproxen as needed  - Previously referred to spine, provided patient with referral phone number if no one calls her within 2 days.     Tachycardia  Elevated BP without diagnosis of hypertension  Did on last visit and today.  Tachycardia up to 130, on recheck improved to 112.  Blood pressure is elevated 130/94, recheck was similar.  Patient reports that ambulatory blood pressures and heart rates are normal and that her elevated blood pressure and tachycardia are secondary to anxiety.  Suspect that this is whitecoat hypertension/tachycardia.  Rhythm is normal on exam.  Asymptomatic.  We will check blood count for anemia, electrolytes, thyroid function.  - CBC with platelets  - Comprehensive metabolic panel (BMP + Alb, Alk Phos, ALT, AST, Total. Bili, TP)  - TSH with free T4 reflex  -Instructed patient to take ambulatory blood pressure and heart rate readings and bring her to next visit.  -Address anxiety as noted below     Anxiety  Chronic, appears well controlled.  Follows with telehealth [email or web address].  MARIANO-7 score 4.    - Continue BuSpar as prescribed by [facility].***

## 2025-05-08 NOTE — PROGRESS NOTES
Prior to immunization administration, verified patients identity using patient s name and date of birth. Please see Immunization Activity for additional information.     Screening Questionnaire for Adult Immunization    Are you sick today?   No   Do you have allergies to medications, food, a vaccine component or latex?   No   Have you ever had a serious reaction after receiving a vaccination?   No   Do you have a long-term health problem with heart, lung, kidney, or metabolic disease (e.g., diabetes), asthma, a blood disorder, no spleen, complement component deficiency, a cochlear implant, or a spinal fluid leak?  Are you on long-term aspirin therapy?   No   Do you have cancer, leukemia, HIV/AIDS, or any other immune system problem?   No   Do you have a parent, brother, or sister with an immune system problem?   No   In the past 3 months, have you taken medications that affect  your immune system, such as prednisone, other steroids, or anticancer drugs; drugs for the treatment of rheumatoid arthritis, Crohn s disease, or psoriasis; or have you had radiation treatments?   No   Have you had a seizure, or a brain or other nervous system problem?   No   During the past year, have you received a transfusion of blood or blood    products, or been given immune (gamma) globulin or antiviral drug?   No   For women: Are you pregnant or is there a chance you could become       pregnant during the next month?   No   Have you received any vaccinations in the past 4 weeks?   No     Immunization questionnaire answers were all negative.      Patient instructed to remain in clinic for 15 minutes afterwards, and to report any adverse reactions.     Screening performed by Jt Young MA on 5/8/2025 at 1:20 PM.

## 2025-05-08 NOTE — PROGRESS NOTES
Preventive Care Visit  [facility]  Barby Srivastava MD, Family Medicine  May 8, 2025  {Provider  Link to [facility] :080096}    {PROVIDER CHARTING PREFERENCE:234655}    Mirella Barnett is a 25 year old, presenting for the following:  Physical, pcos, and Diabetes        5/8/2025     1:13 PM   Additional Questions   Roomed by shannan unique   Accompanied by self          HPI  ***   {MA/LPN/RN Pre-Provider Visit Orders- hCG/UA/Strep (Optional):138769}  {SUPERLIST (Optional):644985}  {additonal problems for provider to add (Optional):078035}  Advance Care Planning  {The storyboard will display whether the patient has ACP docs on file. Hover over the Code section in the storyboard to access the ACP documents. :473856}  {(AWV REQUIRED) Advance Care Planning Reviewed:487206}        5/5/2025   General Health   How would you rate your overall physical health? (!) FAIR   Feel stress (tense, anxious, or unable to sleep) Rather much   (!) STRESS CONCERN      5/5/2025   Nutrition   Three or more servings of calcium each day? Yes   Diet: Regular (no restrictions)   How many servings of fruit and vegetables per day? 4 or more   How many sweetened beverages each day? 0-1         5/5/2025   Exercise   Days per week of moderate/strenous exercise 5 days   Average minutes spent exercising at this level 30 min         5/5/2025   Social Factors   Frequency of gathering with friends or relatives Three times a week   Worry food won't last until get money to buy more No   Food not last or not have enough money for food? No   Do you have housing? (Housing is defined as stable permanent housing and does not include staying outside in a car, in a tent, in an abandoned building, in an overnight shelter, or couch-surfing.) Yes   Are you worried about losing your housing? No   Lack of transportation? No   Unable to get utilities (heat,electricity)? No         5/5/2025   Dental   Dentist two times every year? (!) NO         Today's  "PHQ-2 Score:       5/8/2025     1:08 PM   PHQ-2 ( 1999 Pf[employer])   Q1: Little interest or pleasure in doing things 0   Q2: Feeling down, depressed or hopeless 0   PHQ-2 Score 0    Q1: Little interest or pleasure in doing things Not at all   Q2: Feeling down, depressed or hopeless Not at all   PHQ-2 Score 0       Patient-reported           5/5/2025   Substance Use   Alcohol more than 3/day or more than 7/wk No   Do you use any other substances recreationally? No     Social History     Tobacco Use    Smoking status: Never    Smokeless tobacco: Never   Vaping Use    Vaping status: Never Used   Substance Use Topics    Alcohol use: Never    Drug use: Never     {Provider  If there are gaps in the social history shown above, please follow the link to update and then refresh the note Link to Social and Substance History :493994}     {Mammogram Decision Support (Optional):233867}        5/5/2025   STI Screening   New sexual partner(s) since last STI/HIV test? No     History of abnormal Pap smear: { :113567}             5/5/2025   Contraception/Family Planning   Questions about contraception or family planning No     {Provider  REQUIRED FOR AWV Use the storyboard to review patient history, after sections have been marked as reviewed, refresh note to capture documentation:056642}   Reviewed and updated as needed this visit by Provider                    {HISTORY OPTIONS (Optional):267765}    {ROS Picklists (Optional):132583}     Objective    Exam  BP (!) 160/82 (BP Location: Left arm, Patient Position: Sitting, Cuff Size: Adult Large)   Pulse 105   Temp 98  F (36.7  C) (Oral)   Resp 20   Ht 1.65 m (5' 4.96\")   Wt 118.8 kg (262 lb)   LMP 04/15/2025   SpO2 98%   BMI 43.65 kg/m     Estimated body mass index is 43.65 kg/m  as calculated from the following:    Height as of this encounter: 1.65 m (5' 4.96\").    Weight as of this encounter: 118.8 kg (262 lb).    Physical Exam  {Exam Choices (Optional):323938}        Signed " Electronically by: Barby Srivastava MD  {Email feedback regarding this note to [email or web address]   :284742}

## 2025-05-08 NOTE — PROGRESS NOTES
Preventive Care Visit  [facility]  Barby Srivastava MD, Family Medicine  May 8, 2025      Ericka was seen today for physical, pcos and diabetes.    Diagnoses and all orders for this visit:    Routine general medical examination at a health care facility  -     PRIMARY CARE FOLLOW-UP SCHEDULING; Future  -     REVIEW OF HEALTH MAINTENANCE PROTOCOL ORDERS    Cervical cancer screening  -     Pap Screen Reflex to HPV if ASCUS - Recommended Age 25 - 29 Years    Irregular periods  Comments:  Previously followed with OB/GYN at .  Thought to be PCOS.  Had high instantly, normal testosterone, TSH, prolactin.  Was on metformin and minipill.  No pelvic ultrasound on file.  Declined restarting metformin and minipill.  Will check labs and ultrasound  Orders:  -     CBC with platelets; Future  -     TSH with free T4 reflex; Future  -     Prolactin; Future  -     Testosterone Bioavailable; Future  -     HCG qualitative urine POCT, Enter/Edit Result  -     US Pelvic Complete with Transvaginal; Future  -     CBC with platelets  -     TSH with free T4 reflex  -     Prolactin  -     Testosterone Bioavailable    Need for vaccination  -     HPV9 (GARDASIL 9)  -     TDAP 10-64Y (ADACEL,BOOSTRIX)  -     COVID-19 12+ (PFIZER)    Screening for hyperlipidemia  -     Lipid Profile; Future  -     Lipid Profile    Screening for diabetes mellitus  -     Hemoglobin A1c; Future  -     Hemoglobin A1c    Class 3 severe obesity without serious comorbidity with body mass index (BMI) of 40.0 to 44.9 in adult, unspecified obesity type (H)  Comments:  Discussed Lifestyle modifications, including: Increasing intake of vegetables and fruits, decreasing intake of processed foods/carbohydrates/sugars, having regular physical activity, and losing weight.  Start Ozempic  RTC 1 month  Orders:  -     Comprehensive metabolic panel; Future  -     Comprehensive metabolic panel    Anxiety  Comments:  Was on BuSpar prescribed by [facility], self  stopped.  Stable    Elevated BP without diagnosis of hypertension  Comments:  Patient reports ambulatory -140.   Monitor ambulatory blood pressures, RTC 1 month    Tachycardia  Comments:  Thought to be 2/2 anxiety.      The longitudinal plan of care for the diagnosis(es)/condition(s) as documented were addressed during this visit. Due to the added complexity in care, I will continue to support Amador in the subsequent management and with ongoing continuity of care.        Mirella Barnett is a 25 year old, presenting for the following:  Physical, pcos, and Diabetes        5/8/2025     1:13 PM   Additional Questions   Roomed by shannan calhoun   Accompanied by self          HPI    Irregular periods  PCOS  Was told she might have PCOS  Long period from Aug to Oct.   Since has been irregular.   LMP 4/15/25.   OB note form 2016 reviewed:  Labs: normal TSH, prolactin, testosterone. Insulin was high.   Thought to be PCOS, started on metformin and minipill, has stopped.   Declined restarting     Lab Results   Component Value Date    A1C 5.6 11/14/2022         .     Tachycardia  Elevated BP without diagnosis of hypertension  Did on last visit and today.  Tachycardia up to 130, on recheck improved to 112.  Blood pressure is elevated 130/94, recheck was similar.  Patient reports that ambulatory blood pressures and heart rates are normal and that her elevated blood pressure and tachycardia are secondary to anxiety.  Suspect that this is whitecoat hypertension/tachycardia.  Rhythm is normal on exam.  Asymptomatic.  We will check blood count for anemia, electrolytes, thyroid function.  - CBC with platelets  - Comprehensive metabolic panel (BMP + Alb, Alk Phos, ALT, AST, Total. Bili, TP)  - TSH with free T4 reflex  -Instructed patient to take ambulatory blood pressure and heart rate readings and bring her to next visit.  -Address anxiety as noted below     Anxiety  Chronic, stable  - Continue BuSpar as prescribed by [facility].    Advance Care Planning  Discussed advance care planning with patient; however, patient declined at this time.        5/5/2025   General Health   How would you rate your overall physical health? (!) FAIR   Feel stress (tense, anxious, or unable to sleep) Rather much   (!) STRESS CONCERN      5/5/2025   Nutrition   Three or more servings of calcium each day? Yes   Diet: Regular (no restrictions)   How many servings of fruit and vegetables per day? 4 or more   How many sweetened beverages each day? 0-1         5/5/2025   Exercise   Days per week of moderate/strenous exercise 5 days   Average minutes spent exercising at this level 30 min         5/5/2025   Social Factors   Frequency of gathering with friends or relatives Three times a week   Worry food won't last until get money to buy more No   Food not last or not have enough money for food? No   Do you have housing? (Housing is defined as stable permanent housing and does not include staying outside in a car, in a tent, in an abandoned building, in an overnight shelter, or couch-surfing.) Yes   Are you worried about losing your housing? No   Lack of transportation? No   Unable to get utilities (heat,electricity)? No         5/5/2025   Dental   Dentist two times every year? (!) NO         Today's PHQ-2 Score:       5/8/2025     1:08 PM   PHQ-2 ( 1999 Pfizer)   Q1: Little interest or pleasure in doing things 0   Q2: Feeling down, depressed or hopeless 0   PHQ-2 Score 0    Q1: Little interest or pleasure in doing things Not at all   Q2: Feeling down, depressed or hopeless Not at all   PHQ-2 Score 0       Patient-reported           5/5/2025   Substance Use   Alcohol more than 3/day or more than 7/wk No   Do you use any other substances recreationally? No     Social History     Tobacco Use    Smoking status: Never    Smokeless tobacco: Never   Vaping Use    Vaping status: Never Used   Substance Use Topics    Alcohol use: Never    Drug use: Never               "5/5/2025   STI Screening   New sexual partner(s) since last STI/HIV test? No     History of abnormal Pap smear: No - age 21-29 PAP every 3 years recommended             5/5/2025   Contraception/Family Planning   Questions about contraception or family planning No       Reviewed and updated as needed this visit by Provider                         Objective    Exam  BP (!) 160/82 (BP Location: Left arm, Patient Position: Sitting, Cuff Size: Adult Large)   Pulse 97   Temp 98  F (36.7  C) (Oral)   Resp 20   Ht 1.65 m (5' 4.96\")   Wt 118.8 kg (262 lb)   LMP 04/15/2025   SpO2 100%   BMI 43.65 kg/m     Estimated body mass index is 43.65 kg/m  as calculated from the following:    Height as of this encounter: 1.65 m (5' 4.96\").    Weight as of this encounter: 118.8 kg (262 lb).    Physical Exam  General: Well developed, well nourished.  Skin:  Dry without rash.    Head:  Normocephalic-atraumatic.    Eye:  Normal conjunctivae.     Respiratory:  Normal respiratory effort.   Gastrointestinal:  Non-distended.    Musculoskeletal:  No deformity or edema.  Neurologic: No focal deficits.  GYN  Normal urethra, Labia majora and Labia minora, Vagina is normal in appearance without discharge. Anus/perineum normal  Speculum exam: normal cervix, no discharge            Signed Electronically by: Barby Srivastava MD    "

## 2025-05-13 LAB
TESTOST FREE SERPL-MCNC: 0.57 NG/DL
TESTOST SERPL-MCNC: 29 NG/DL (ref 8–60)

## 2025-05-14 LAB
BKR LAB AP GYN ADEQUACY: NORMAL
BKR LAB AP GYN INTERPRETATION: NORMAL
BKR LAB AP HPV REFLEX: NORMAL
BKR LAB AP PREVIOUS ABNORMAL: NORMAL
PATH REPORT.COMMENTS IMP SPEC: NORMAL
PATH REPORT.COMMENTS IMP SPEC: NORMAL
PATH REPORT.RELEVANT HX SPEC: NORMAL

## 2025-05-15 ENCOUNTER — MYC MEDICAL ADVICE (OUTPATIENT)
Dept: FAMILY MEDICINE | Facility: CLINIC | Age: 25
End: 2025-05-15
Payer: COMMERCIAL

## 2025-05-15 DIAGNOSIS — E66.813 CLASS 3 SEVERE OBESITY WITHOUT SERIOUS COMORBIDITY WITH BODY MASS INDEX (BMI) OF 40.0 TO 44.9 IN ADULT, UNSPECIFIED OBESITY TYPE (H): Primary | ICD-10-CM

## 2025-05-15 NOTE — TELEPHONE ENCOUNTER
"Writer confirms there is no order for Ozempic in chart. Pended.     Per 5/8 visit:    \"Class 3 severe obesity without serious comorbidity with body mass index (BMI) of 40.0 to 44.9 in adult, unspecified obesity type (H)  Comments:  Discussed Lifestyle modifications, including: Increasing intake of vegetables and fruits, decreasing intake of processed foods/carbohydrates/sugars, having regular physical activity, and losing weight.  Start Ozempic  RTC 1 month\"  "

## 2025-05-30 ENCOUNTER — HOSPITAL ENCOUNTER (OUTPATIENT)
Dept: ULTRASOUND IMAGING | Facility: HOSPITAL | Age: 25
Discharge: HOME | End: 2025-05-30
Attending: STUDENT IN AN ORGANIZED HEALTH CARE EDUCATION/TRAINING PROGRAM | Admitting: STUDENT IN AN ORGANIZED HEALTH CARE EDUCATION/TRAINING PROGRAM
Payer: COMMERCIAL

## 2025-05-30 DIAGNOSIS — N92.6 IRREGULAR PERIODS: ICD-10-CM

## 2025-05-30 PROCEDURE — 76856 US EXAM PELVIC COMPLETE: CPT

## 2025-06-19 ENCOUNTER — TELEPHONE (OUTPATIENT)
Dept: FAMILY MEDICINE | Facility: CLINIC | Age: 25
End: 2025-06-19
Payer: COMMERCIAL

## 2025-06-23 NOTE — TELEPHONE ENCOUNTER
Prior Authorization Approval    Medication: WEGOVY 0.25 MG/0.5ML SC SOAJ  Authorization Effective Date: 6/23/2025  Authorization Expiration Date: 1/23/2026  Approved Dose/Quantity:   Reference #: [id number]   Insurance Company: [employer] [phone] Fax [phone]  Expected CoPay: $    CoPay Card Available:      Financial Assistance Needed:   Which Pharmacy is filling the prescription: [id number] - [place], [place] - [address] [place] [address]  Pharmacy Notified: yes  Patient Notified: Instructed pharmacy to notify patient when script is ready to pick-up/ship